# Patient Record
Sex: MALE | URBAN - METROPOLITAN AREA
[De-identification: names, ages, dates, MRNs, and addresses within clinical notes are randomized per-mention and may not be internally consistent; named-entity substitution may affect disease eponyms.]

---

## 2021-09-24 ENCOUNTER — OFFICE VISIT (OUTPATIENT)
Dept: URBAN - METROPOLITAN AREA CLINIC 65 | Facility: CLINIC | Age: 66
End: 2021-09-24
Payer: MEDICARE

## 2021-09-24 DIAGNOSIS — H43.813 VITREOUS DEGENERATION, BILATERAL: Primary | ICD-10-CM

## 2021-09-24 DIAGNOSIS — H33.101 UNSPECIFIED RETINOSCHISIS, RIGHT EYE: ICD-10-CM

## 2021-09-24 DIAGNOSIS — H25.13 AGE-RELATED NUCLEAR CATARACT, BILATERAL: ICD-10-CM

## 2021-09-24 PROCEDURE — 92004 COMPRE OPH EXAM NEW PT 1/>: CPT | Performed by: OPHTHALMOLOGY

## 2021-09-24 PROCEDURE — 92201 OPSCPY EXTND RTA DRAW UNI/BI: CPT | Performed by: OPHTHALMOLOGY

## 2021-09-24 PROCEDURE — 92134 CPTRZ OPH DX IMG PST SGM RTA: CPT | Performed by: OPHTHALMOLOGY

## 2021-09-24 ASSESSMENT — INTRAOCULAR PRESSURE
OD: 14
OS: 13

## 2021-09-24 NOTE — IMPRESSION/PLAN
Impression: Age-related nuclear cataract, bilateral: H25.13.  Plan: NVS; MRx followed by Dr. Jewell Cortez

## 2021-09-24 NOTE — IMPRESSION/PLAN
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 Plan: Careful scleral depressed exam demonstrates retinoschisis OD. OCT confirms no progression into an RD. No inner or outer retinal holes identified. Discussed that retinoschisis can progress to a small degree but is not likely to progress significantly, unless the retinoschisis were to develop inner and outer retinal holes. Warning symptoms discussed. Reassurance provided. 

RTC 4-6 mo with OCT OU, optos colors OU

## 2022-05-20 ENCOUNTER — OFFICE VISIT (OUTPATIENT)
Dept: URBAN - METROPOLITAN AREA CLINIC 64 | Facility: CLINIC | Age: 67
End: 2022-05-20
Payer: MEDICARE

## 2022-05-20 DIAGNOSIS — H11.052 PERIPHERAL PTERYGIUM, PROGRESSIVE, LEFT EYE: Primary | ICD-10-CM

## 2022-05-20 DIAGNOSIS — H11.051 PERIPHERAL PTERYGIUM, PROGRESSIVE, RIGHT EYE: ICD-10-CM

## 2022-05-20 PROCEDURE — 99204 OFFICE O/P NEW MOD 45 MIN: CPT | Performed by: STUDENT IN AN ORGANIZED HEALTH CARE EDUCATION/TRAINING PROGRAM

## 2022-05-20 NOTE — IMPRESSION/PLAN
Impression: Peripheral pterygium, progressive, left eye: H11.052. Plan: Discussed diagnosis and treatment options with patient. Reviewed risks/benefits/alternatives to surgical removal including possible change in refraction, risk of recurrence, mitomycin C, and corneal scarring/risk of corneal melt. Patient wishes to proceed with surgery.  
Schedule pterygium removal OS then OD

## 2022-06-07 ENCOUNTER — ADULT PHYSICAL (OUTPATIENT)
Dept: URBAN - METROPOLITAN AREA CLINIC 64 | Facility: CLINIC | Age: 67
End: 2022-06-07
Payer: MEDICARE

## 2022-06-07 DIAGNOSIS — H11.051 PERIPHERAL PTERYGIUM, PROGRESSIVE, RIGHT EYE: ICD-10-CM

## 2022-06-07 DIAGNOSIS — Z01.818 ENCOUNTER FOR OTHER PREPROCEDURAL EXAMINATION: Primary | ICD-10-CM

## 2022-06-07 DIAGNOSIS — H11.052 PERIPHERAL PTERYGIUM, PROGRESSIVE, LEFT EYE: ICD-10-CM

## 2022-06-07 PROCEDURE — 99203 OFFICE O/P NEW LOW 30 MIN: CPT | Performed by: PHYSICIAN ASSISTANT

## 2022-06-29 ENCOUNTER — SURGERY (OUTPATIENT)
Dept: URBAN - METROPOLITAN AREA SURGERY 42 | Facility: SURGERY | Age: 67
End: 2022-06-29
Payer: MEDICARE

## 2022-06-29 RX ORDER — NEOMYCIN SULFATE, POLYMYXIN B SULFATE AND DEXAMETHASONE 3.5; 10000; 1 MG/G; [USP'U]/G; MG/G
OINTMENT OPHTHALMIC
Qty: 5 | Refills: 0 | Status: INACTIVE
Start: 2022-06-29 | End: 2022-07-05